# Patient Record
Sex: FEMALE | HISPANIC OR LATINO
[De-identification: names, ages, dates, MRNs, and addresses within clinical notes are randomized per-mention and may not be internally consistent; named-entity substitution may affect disease eponyms.]

---

## 2023-05-22 ENCOUNTER — RESULT CHARGE (OUTPATIENT)
Age: 78
End: 2023-05-22

## 2023-05-22 ENCOUNTER — APPOINTMENT (OUTPATIENT)
Dept: UROLOGY | Facility: CLINIC | Age: 78
End: 2023-05-22
Payer: MEDICARE

## 2023-05-22 VITALS
TEMPERATURE: 98 F | DIASTOLIC BLOOD PRESSURE: 61 MMHG | WEIGHT: 123 LBS | OXYGEN SATURATION: 98 % | HEIGHT: 64 IN | HEART RATE: 67 BPM | BODY MASS INDEX: 21 KG/M2 | SYSTOLIC BLOOD PRESSURE: 114 MMHG

## 2023-05-22 DIAGNOSIS — Z87.898 PERSONAL HISTORY OF OTHER SPECIFIED CONDITIONS: ICD-10-CM

## 2023-05-22 DIAGNOSIS — Z86.79 PERSONAL HISTORY OF OTHER DISEASES OF THE CIRCULATORY SYSTEM: ICD-10-CM

## 2023-05-22 DIAGNOSIS — Z78.9 OTHER SPECIFIED HEALTH STATUS: ICD-10-CM

## 2023-05-22 DIAGNOSIS — Z80.49 FAMILY HISTORY OF MALIGNANT NEOPLASM OF OTHER GENITAL ORGANS: ICD-10-CM

## 2023-05-22 DIAGNOSIS — D17.71 BENIGN LIPOMATOUS NEOPLASM OF KIDNEY: ICD-10-CM

## 2023-05-22 PROCEDURE — 99204 OFFICE O/P NEW MOD 45 MIN: CPT

## 2023-05-22 PROCEDURE — 51798 US URINE CAPACITY MEASURE: CPT

## 2023-05-22 RX ORDER — OXYBUTYNIN CHLORIDE 10 MG/1
10 TABLET, EXTENDED RELEASE ORAL
Qty: 30 | Refills: 3 | Status: ACTIVE | COMMUNITY
Start: 2023-05-22 | End: 1900-01-01

## 2023-05-22 RX ORDER — AMLODIPINE BESYLATE 5 MG/1
5 TABLET ORAL
Refills: 0 | Status: ACTIVE | COMMUNITY

## 2023-05-22 RX ORDER — METOPROLOL TARTRATE 50 MG/1
50 TABLET, FILM COATED ORAL
Refills: 0 | Status: ACTIVE | COMMUNITY

## 2023-05-22 RX ORDER — ESTRADIOL 0.1 MG/G
0.1 CREAM VAGINAL
Qty: 1 | Refills: 3 | Status: ACTIVE | COMMUNITY
Start: 2023-05-22 | End: 1900-01-01

## 2023-05-22 NOTE — ASSESSMENT
[FreeTextEntry1] : Ms. FORTUNE has seen me today for an evaluation of overactive bladder (OAB) (urinary urgency, frequency, and urge incontinence).  Additionally, she has clinical and physical symptoms of severe vulvovaginal atrophy which may be exacerbating her symptoms.  A urinalysis today shows trace blood, no concern for overlying infection.  A PVR of 0 suggest the patient is emptying well\par \par We have extensively reviewed the OAB care pathway and discussed: \par \par 1st line therapy:  \par       -Behavioral therapy -limiting fluid intake, caffeine, and artificial sweeteners; maintaining a normal weight \par       -Medical comorbidities - managing sleep apnea, diabetes, lower extremity edema, and CHF if present  \par       -Conservative management options - observation, pelvic floor physical therapy \par \par 2nd line therapy:  \par       -Medications (anticholinergics, Beta-agonists)\par  \par Reviewed risks and benefits of concomitant estrace use. Patient has no h/o of GYN malignancy, fatty liver disease. No contraindication to use. Application instructions and need to maintain vaginal tissues discussed.  All questions answered.\par \par Recommendations\par -Start oxybutynin 10 ER\par -Start Estrace cream\par -RTC 1 month\par \par

## 2023-05-22 NOTE — HISTORY OF PRESENT ILLNESS
[Urinary Urgency] : urinary urgency [Urinary Frequency] : urinary frequency [FreeTextEntry1] : 78F presents for initial evaluation of urinary frequency \par Referred by Dr. Castillo\par \par PMH significant for: angiomyolipoma, HTN, bronchiectasis\par PSH significant for: left nephrectomy, lumpectomy\par Significant meds: amlodipine, metoprolol, prolia\par \par Patient reports months-long history of  pelvic pain/pressure and urinary frequency\par Reports moderate level of distress \par Associated with vaginal dryness, irritation, discomfort\par Previously treated with nothing\par \par Daytime Frequency: >10\par Nighttime Frequency: 2-3\par Pads per day: 0\par Straining to void: no\par Subjective Incomplete Emptying: occasionally\par \par Daily Fluid Total: 6 glasses of water\par Daily Caffeine Total: 24 oz\par  \par Fecal Incontinence: no\par Neurologic Hx, Vision or Balance changes: no\par

## 2023-05-22 NOTE — PHYSICAL EXAM
[General Appearance - Well Developed] : well developed [General Appearance - Well Nourished] : well nourished [Abdomen Soft] : soft [Abdomen Tenderness] : non-tender [Costovertebral Angle Tenderness] : no ~M costovertebral angle tenderness [Urethral Meatus] : the meatus of the urethra showed no abnormalities [Normal Station and Gait] : the gait and station were normal for the patient's age [FreeTextEntry1] : Severe vulvovaginal atrophy.  Evidence of excoriation and skin breakdown at the vaginal introitus.

## 2023-05-23 ENCOUNTER — NON-APPOINTMENT (OUTPATIENT)
Age: 78
End: 2023-05-23

## 2023-06-26 ENCOUNTER — APPOINTMENT (OUTPATIENT)
Dept: UROLOGY | Facility: CLINIC | Age: 78
End: 2023-06-26
Payer: MEDICARE

## 2023-06-26 VITALS
WEIGHT: 122 LBS | HEIGHT: 64 IN | BODY MASS INDEX: 20.83 KG/M2 | DIASTOLIC BLOOD PRESSURE: 79 MMHG | OXYGEN SATURATION: 98 % | SYSTOLIC BLOOD PRESSURE: 131 MMHG

## 2023-06-26 DIAGNOSIS — N95.2 POSTMENOPAUSAL ATROPHIC VAGINITIS: ICD-10-CM

## 2023-06-26 DIAGNOSIS — N32.81 OVERACTIVE BLADDER: ICD-10-CM

## 2023-06-26 DIAGNOSIS — N39.3 STRESS INCONTINENCE (FEMALE) (MALE): ICD-10-CM

## 2023-06-26 PROCEDURE — 99214 OFFICE O/P EST MOD 30 MIN: CPT

## 2023-06-26 PROCEDURE — 51798 US URINE CAPACITY MEASURE: CPT

## 2023-06-26 NOTE — HISTORY OF PRESENT ILLNESS
[FreeTextEntry1] : 78F presents for follow up evaluation \par \par h/o OAB \par Taking oxybutynin 10mg \par DTF: >10  \par NTF: 2-3 \par PPD: 0  \par Never started the medication because she got sick shortly after seeing us.\par \par h/o severe vulvovaginal atrophy \par Started on estrace cream \par \par Reports upper respiratory infection and has been on antibiotics since Thursday.\par On Saturday night developed burning and ithcing in the vaginal area.\par Bought cortisone cream today and applied it before coming to the office and reports mild improvement.\par

## 2023-06-26 NOTE — PHYSICAL EXAM
[General Appearance - Well Developed] : well developed [General Appearance - Well Nourished] : well nourished [Abdomen Soft] : soft [Abdomen Tenderness] : non-tender [FreeTextEntry1] : PVR:0, Filled bladder to 250 cc, +++BRIDGETTE, no urethral mobility, vaginal atrophy, no evidence of yeast infection

## 2023-06-26 NOTE — ASSESSMENT
[FreeTextEntry1] : Ms. Rizvi presents for follow up of urge predominant mixed incontinence (Urge >Stress)\par Patient was prescribed oxybutynin at last visit.  States she has not taken due to a recent upper respiratory tract infection.\par Has been taking vaginal Estrace cream and reports symptomatic improvement.\par \par Her exam today is notable for significant stress urinary incontinence with limited urethral mobility.  A PVR of 0 suggest she is emptying well.  Despite this, the patient is most bothered by her urge symptoms and is not interested in reviewing options for stress incontinence at this time.\par \par Recommendations\par -Start oxybutynin ER 10 mg.  Counseled patient there is no contraindication in taking with concurrent antibiotics\par -Continue vaginal Estrace cream\par -RTC 1 month

## 2023-08-08 ENCOUNTER — APPOINTMENT (OUTPATIENT)
Dept: UROLOGY | Facility: CLINIC | Age: 78
End: 2023-08-08

## 2024-03-06 ENCOUNTER — APPOINTMENT (OUTPATIENT)
Dept: GASTROENTEROLOGY | Facility: CLINIC | Age: 79
End: 2024-03-06

## 2025-03-11 ENCOUNTER — APPOINTMENT (OUTPATIENT)
Dept: SURGERY | Facility: CLINIC | Age: 80
End: 2025-03-11
Payer: MEDICARE

## 2025-03-11 PROCEDURE — 99203 OFFICE O/P NEW LOW 30 MIN: CPT
